# Patient Record
Sex: FEMALE | Race: WHITE | Employment: FULL TIME | URBAN - METROPOLITAN AREA
[De-identification: names, ages, dates, MRNs, and addresses within clinical notes are randomized per-mention and may not be internally consistent; named-entity substitution may affect disease eponyms.]

---

## 2018-01-08 ENCOUNTER — HOSPITAL ENCOUNTER (EMERGENCY)
Age: 38
Discharge: HOME | End: 2018-01-08
Payer: COMMERCIAL

## 2018-01-08 VITALS
HEART RATE: 94 BPM | OXYGEN SATURATION: 100 % | RESPIRATION RATE: 16 BRPM | BODY MASS INDEX: 32.2 KG/M2 | WEIGHT: 230 LBS | DIASTOLIC BLOOD PRESSURE: 79 MMHG | HEIGHT: 71 IN | SYSTOLIC BLOOD PRESSURE: 129 MMHG | TEMPERATURE: 98.6 F

## 2018-01-08 DIAGNOSIS — T14.8XXA BLISTER: ICD-10-CM

## 2018-01-08 DIAGNOSIS — T75.4XXA ELECTRICAL SHOCK OF HAND, INITIAL ENCOUNTER: Primary | ICD-10-CM

## 2018-01-08 LAB
EKG ATRIAL RATE: 86 BPM
EKG P AXIS: 41 DEGREES
EKG P-R INTERVAL: 160 MS
EKG Q-T INTERVAL: 380 MS
EKG QRS DURATION: 90 MS
EKG QTC CALCULATION (BAZETT): 454 MS
EKG R AXIS: -5 DEGREES
EKG T AXIS: 5 DEGREES
EKG VENTRICULAR RATE: 86 BPM

## 2018-01-08 PROCEDURE — 99284 EMERGENCY DEPT VISIT MOD MDM: CPT

## 2018-01-08 PROCEDURE — 93005 ELECTROCARDIOGRAM TRACING: CPT

## 2018-01-08 PROCEDURE — 93010 ELECTROCARDIOGRAM REPORT: CPT | Performed by: INTERNAL MEDICINE

## 2018-01-08 ASSESSMENT — ENCOUNTER SYMPTOMS
VOICE CHANGE: 0
RHINORRHEA: 0
TROUBLE SWALLOWING: 0
SINUS PRESSURE: 0
COUGH: 0
SORE THROAT: 0
STRIDOR: 0
FACIAL SWELLING: 0
SHORTNESS OF BREATH: 0
CHOKING: 0
SINUS PAIN: 0
CHEST TIGHTNESS: 0
WHEEZING: 0

## 2018-01-08 ASSESSMENT — PAIN DESCRIPTION - DESCRIPTORS: DESCRIPTORS: BURNING

## 2018-01-08 ASSESSMENT — PAIN DESCRIPTION - ONSET: ONSET: SUDDEN

## 2018-01-08 ASSESSMENT — PAIN DESCRIPTION - FREQUENCY: FREQUENCY: CONTINUOUS

## 2018-01-08 ASSESSMENT — PAIN DESCRIPTION - LOCATION: LOCATION: FINGER (COMMENT WHICH ONE)

## 2018-01-08 ASSESSMENT — PAIN DESCRIPTION - PROGRESSION: CLINICAL_PROGRESSION: NOT CHANGED

## 2018-01-08 ASSESSMENT — PAIN SCALES - GENERAL: PAINLEVEL_OUTOF10: 4

## 2018-01-08 ASSESSMENT — PAIN DESCRIPTION - ORIENTATION: ORIENTATION: LEFT;MID

## 2018-01-08 ASSESSMENT — PAIN DESCRIPTION - PAIN TYPE: TYPE: ACUTE PAIN

## 2018-01-08 NOTE — ED PROVIDER NOTES
[address] [facility] ED  eMERGENCY dEPARTMENT eNCOUnter      Pt Name: Ck Dc  MRN: [id number]  [place] 1980  Date of evaluation: 1/8/2018  Provider: Anne Sepulveda NP     CHIEF COMPLAINT       Chief Complaint   Patient presents with    Electric Shock     Pt got shocked on a copier at 0715 this morning. Has blister to left middle finger       HISTORY OF PRESENT ILLNESS   (Location/Symptom, Timing/Onset, Context/Setting, Quality, Duration, Modifying Factors, Severity) Note limiting factors. This is a 44year old female patient who presents to the ER after being shocked on her left middle finger by a copy machine. The patient states that she felt an electrical shock and was unable to move her finger from the button that shocked her. The patient denies any other injury, chest pain, shortness of breath. The patient did not pass out and denies any headache, lightheadedness, dizziness, blurred vision or double vision. Nursing Notes were reviewed. REVIEW OF SYSTEMS    (2+ for level 4; 10+ for level 5)     Review of Systems   Constitutional: Negative for activity change and fever. HENT: Negative for congestion, ear discharge, ear pain, facial swelling, postnasal drip, rhinorrhea, sinus pain, sinus pressure, sneezing, sore throat, trouble swallowing and voice change. Respiratory: Negative for cough, choking, chest tightness, shortness of breath, wheezing and stridor. Cardiovascular: Negative for chest pain. Neurological: Negative for dizziness, tremors, seizures, syncope, facial asymmetry, speech difficulty, weakness, light-headedness, numbness and headaches. Except as noted above the remainder of the review of systems was reviewed and negative.      PAST MEDICAL HISTORY     Past Medical History:   Diagnosis Date    Mitral valve prolapse     Ovarian cyst        SURGICAL HISTORY       Past Surgical History:   Procedure Laterality Date    HERNIA REPAIR  6/28/12 patient has full ROM of the finger and has minimal pain. Psychiatric: She has a normal mood and affect. DIAGNOSTIC RESULTS     EKG: All EKG's are interpreted by the Emergency Department Physician who either signs or Co-signs this chart in the absence of a cardiologist.      RADIOLOGY:   Non-plain film images such as CT, Ultrasound and MRI are read by the radiologist. Plain radiographic images are visualized and preliminarily interpreted by the emergency physician with the below findings:    Patient's EKG shows normal sinus rhythm with ventricular rate of 86. There is no st elevation or depression. The patient does not have a previous EKG for comparison. Interpretation per the Radiologist below (if available at the time of note entry):    No orders to display       LABS:  Labs Reviewed - No data to display    All other labs were within normal range or not returned as of this dictation. EMERGENCY DEPARTMENT COURSE and DIFFERENTIAL DIAGNOSIS/MDM:   Vitals:    Vitals:    01/08/18 0847   BP: 129/79   Pulse: 94   Resp: 16   Temp: 98.6 °F (37 °C)   TempSrc: Oral   SpO2: 100%   Weight: 230 lb (104.3 kg)   Height: 5' 11\" (1.803 m)       MDM    The patient presents to the ER with the above stated complaint. An ekg was obtained to rule out any arrhythmia, which is not present. The patient will be discharged home with instructions not to pop the blister if it becomes fluid contained to let it pop on its own and to watch for signs of infection. The patient was instructed to follow up with [facility] and to return to the ER for any new and concerning symptoms      CRITICAL CARE TIME     Total Critical Care time (not applicable if blank)      Total minutes, excluding separately reportable procedures. There was a high probability of clinically significant/life threatening deterioration in the patient's condition which required my urgent intervention.  This includes discussing the case with consultants, reviewing laboratory studies and images independently, arranging disposition, and speaking with patient/family    CONSULTS:  None    PROCEDURES:  Unless otherwise noted below, none     Procedures    FINAL IMPRESSION      1. Electrical shock of hand, initial encounter    2.  Blister          DISPOSITION/PLAN   DISPOSITION Decision To Discharge 01/08/2018 09:40:09 AM      PATIENT REFERRED TO:  Dejon Núñezmarcossharon Villalobos [phone]    In 1 day        DISCHARGE MEDICATIONS:  Discharge Medication List as of 1/8/2018  9:41 AM             (Please note that portions of this note were completed with a voice recognition program.  Efforts were made to edit the dictations but occasionally words and phrases are mis-transcribed.)    Jaylon Medina NP  (electronically signed)                 Jaylon Medina NP  01/08/18 8963

## 2018-03-14 ENCOUNTER — EMPLOYEE WELLNESS (OUTPATIENT)
Dept: OTHER | Age: 38
End: 2018-03-14

## 2018-03-14 LAB
CHOLESTEROL, TOTAL: 203 MG/DL (ref 0–199)
GLUCOSE BLD-MCNC: 80 MG/DL (ref 74–109)
HDLC SERPL-MCNC: 40 MG/DL (ref 40–59)
LDL CHOLESTEROL CALCULATED: 122 MG/DL (ref 0–129)
TRIGL SERPL-MCNC: 207 MG/DL (ref 0–200)

## 2018-04-02 VITALS — WEIGHT: 265 LBS | BODY MASS INDEX: 36.96 KG/M2

## 2020-01-15 ENCOUNTER — OFFICE VISIT (OUTPATIENT)
Dept: FAMILY MEDICINE CLINIC | Age: 40
End: 2020-01-15
Payer: COMMERCIAL

## 2020-01-15 VITALS
HEART RATE: 117 BPM | WEIGHT: 270 LBS | SYSTOLIC BLOOD PRESSURE: 124 MMHG | HEIGHT: 71 IN | RESPIRATION RATE: 15 BRPM | DIASTOLIC BLOOD PRESSURE: 82 MMHG | TEMPERATURE: 99 F | OXYGEN SATURATION: 98 % | BODY MASS INDEX: 37.8 KG/M2

## 2020-01-15 LAB
INFLUENZA A ANTIBODY: ABNORMAL
INFLUENZA B ANTIBODY: ABNORMAL

## 2020-01-15 PROCEDURE — 99213 OFFICE O/P EST LOW 20 MIN: CPT | Performed by: NURSE PRACTITIONER

## 2020-01-15 PROCEDURE — 87804 INFLUENZA ASSAY W/OPTIC: CPT | Performed by: NURSE PRACTITIONER

## 2020-01-15 RX ORDER — OSELTAMIVIR PHOSPHATE 75 MG/1
75 CAPSULE ORAL 2 TIMES DAILY
Qty: 10 CAPSULE | Refills: 0 | Status: SHIPPED | OUTPATIENT
Start: 2020-01-15 | End: 2020-01-20

## 2020-01-15 ASSESSMENT — PATIENT HEALTH QUESTIONNAIRE - PHQ9
SUM OF ALL RESPONSES TO PHQ QUESTIONS 1-9: 0
2. FEELING DOWN, DEPRESSED OR HOPELESS: 0
SUM OF ALL RESPONSES TO PHQ9 QUESTIONS 1 & 2: 0
SUM OF ALL RESPONSES TO PHQ QUESTIONS 1-9: 0
1. LITTLE INTEREST OR PLEASURE IN DOING THINGS: 0

## 2020-01-15 NOTE — PROGRESS NOTES
Subjective:      Patient ID: Aman Urias is a 35 y.o. female who presents today for:  Chief Complaint   Patient presents with    Fever     Patient present today with C/O body aches and cough and fever and headache and loss of appetiate. Patient states that this has been going on for two days and has tried OTC medication with no signs of relife.  Cough    Headache       Fever    This is a new problem. The current episode started in the past 7 days. The problem occurs daily. The problem has been unchanged. Associated symptoms include congestion, coughing, headaches, muscle aches and a sore throat. Pertinent negatives include no chest pain, ear pain, rash, vomiting or wheezing. She has tried nothing for the symptoms.        Past Medical History:   Diagnosis Date    Mitral valve prolapse     Ovarian cyst      Past Surgical History:   Procedure Laterality Date    HERNIA REPAIR  6/28/12    left femoral hernia     Social History     Socioeconomic History    Marital status:      Spouse name: Not on file    Number of children: Not on file    Years of education: Not on file    Highest education level: Not on file   Occupational History    Not on file   Social Needs    Financial resource strain: Not on file    Food insecurity:     Worry: Not on file     Inability: Not on file    Transportation needs:     Medical: Not on file     Non-medical: Not on file   Tobacco Use    Smoking status: Never Smoker    Smokeless tobacco: Never Used   Substance and Sexual Activity    Alcohol use: No    Drug use: No    Sexual activity: Yes     Partners: Male   Lifestyle    Physical activity:     Days per week: Not on file     Minutes per session: Not on file    Stress: Not on file   Relationships    Social connections:     Talks on phone: Not on file     Gets together: Not on file     Attends Yazidism service: Not on file     Active member of club or organization: Not on file     Attends meetings of clubs or organizations: Not on file     Relationship status: Not on file    Intimate partner violence:     Fear of current or ex partner: Not on file     Emotionally abused: Not on file     Physically abused: Not on file     Forced sexual activity: Not on file   Other Topics Concern    Not on file   Social History Narrative    Not on file     No family history on file. Allergies   Allergen Reactions    Augmentin [Amoxicillin-Pot Clavulanate] Rash     Skin peel    Omeprazole Other (See Comments)    Bactrim Rash    Ciprofloxacin Rash         Review of Systems   Constitutional: Positive for activity change, fatigue and fever. Negative for appetite change and chills. HENT: Positive for congestion, postnasal drip, rhinorrhea, sinus pressure and sore throat. Negative for ear discharge, ear pain, sinus pain and trouble swallowing. Eyes: Negative for pain and discharge. Respiratory: Positive for cough. Negative for shortness of breath and wheezing. Cardiovascular: Negative for chest pain and palpitations. Gastrointestinal: Negative for vomiting. Skin: Negative for color change and rash. Neurological: Positive for headaches. Hematological: Negative for adenopathy. Objective:   /82   Pulse 117   Temp 99 °F (37.2 °C) (Temporal)   Resp 15   Ht 5' 11\" (1.803 m)   Wt 270 lb (122.5 kg)   SpO2 98%   BMI 37.66 kg/m²     Physical Exam  Vitals signs reviewed. Constitutional:       General: She is not in acute distress. Appearance: She is ill-appearing. HENT:      Head: Normocephalic and atraumatic. Right Ear: A middle ear effusion is present. Left Ear: A middle ear effusion is present. Nose: Congestion and rhinorrhea present. Mouth/Throat:      Lips: Pink. Mouth: Mucous membranes are moist.      Pharynx: Posterior oropharyngeal erythema present. Eyes:      Conjunctiva/sclera: Conjunctivae normal.   Neck:      Musculoskeletal: Neck supple.    Cardiovascular:

## 2020-01-15 NOTE — PATIENT INSTRUCTIONS
Patient Education        Influenza (Flu): Care Instructions  Your Care Instructions    Influenza (flu) is an infection in the lungs and breathing passages. It is caused by the influenza virus. There are different strains, or types, of the flu virus from year to year. Unlike the common cold, the flu comes on suddenly and the symptoms, such as a cough, congestion, fever, chills, fatigue, aches, and pains, are more severe. These symptoms may last up to 10 days. Although the flu can make you feel very sick, it usually doesn't cause serious health problems. Home treatment is usually all you need for flu symptoms. But your doctor may prescribe antiviral medicine to prevent other health problems, such as pneumonia, from developing. Older people and those who have a long-term health condition, such as lung disease, are most at risk for having pneumonia or other health problems. Follow-up care is a key part of your treatment and safety. Be sure to make and go to all appointments, and call your doctor if you are having problems. It's also a good idea to know your test results and keep a list of the medicines you take. How can you care for yourself at home? · Get plenty of rest.  · Drink plenty of fluids, enough so that your urine is light yellow or clear like water. If you have kidney, heart, or liver disease and have to limit fluids, talk with your doctor before you increase the amount of fluids you drink. · Take an over-the-counter pain medicine if needed, such as acetaminophen (Tylenol), ibuprofen (Advil, Motrin), or naproxen (Aleve), to relieve fever, headache, and muscle aches. Read and follow all instructions on the label. No one younger than 20 should take aspirin. It has been linked to Reye syndrome, a serious illness. · Do not smoke. Smoking can make the flu worse. If you need help quitting, talk to your doctor about stop-smoking programs and medicines.  These can increase your chances of quitting for your doctor if:    · You begin to get better and then get worse.     · You are not getting better after 1 week. Where can you learn more? Go to [email or web address] and sign in to your [employer] account. Enter P362 in the [employer] box to learn more about \"Influenza (Flu): Care Instructions. \"     If you do not have an account, please click on the \"Sign Up Now\" link. Current as of: June 9, 2019  Content Version: 12.3  © 4119-1080 [employer]wise, Incorporated. Care instructions adapted under license by [facility] ([facility]). If you have questions about a medical condition or this instruction, always ask your healthcare professional. [address] any warranty or liability for your use of this information.

## 2020-01-16 ASSESSMENT — ENCOUNTER SYMPTOMS
TROUBLE SWALLOWING: 0
COLOR CHANGE: 0
COUGH: 1
SHORTNESS OF BREATH: 0
EYE DISCHARGE: 0
WHEEZING: 0
SINUS PRESSURE: 1
RHINORRHEA: 1
SINUS PAIN: 0
SORE THROAT: 1
VOMITING: 0
EYE PAIN: 0

## 2020-01-27 ENCOUNTER — HOSPITAL ENCOUNTER (OUTPATIENT)
Dept: WOMENS IMAGING | Age: 40
Discharge: HOME | End: 2020-01-29
Payer: COMMERCIAL

## 2020-01-27 ENCOUNTER — HOSPITAL ENCOUNTER (OUTPATIENT)
Dept: ULTRASOUND IMAGING | Age: 40
Discharge: HOME | End: 2020-01-29
Payer: COMMERCIAL

## 2020-01-27 PROCEDURE — 76642 ULTRASOUND BREAST LIMITED: CPT

## 2020-01-27 PROCEDURE — G0279 TOMOSYNTHESIS, MAMMO: HCPCS

## 2020-04-08 LAB
HBV SURFACE AB TITR SER: REACTIVE MIU/ML
HEPATITIS B SURFACE ANTIGEN INTERPRETATION: NORMAL
HEPATITIS C ANTIBODY INTERPRETATION: NORMAL

## 2020-04-09 LAB — HIV 1,2 COMBO ANTIGEN/ANTIBODY: NEGATIVE

## 2021-06-02 LAB — HIV AG/AB: NONREACTIVE

## 2021-10-01 ENCOUNTER — HOSPITAL ENCOUNTER (OUTPATIENT)
Dept: ULTRASOUND IMAGING | Age: 41
Discharge: HOME | End: 2021-10-03
Payer: COMMERCIAL

## 2021-10-01 ENCOUNTER — HOSPITAL ENCOUNTER (OUTPATIENT)
Dept: WOMENS IMAGING | Age: 41
Discharge: HOME | End: 2021-10-03
Payer: COMMERCIAL

## 2021-10-01 DIAGNOSIS — N63.0 LUMP OR MASS IN BREAST: ICD-10-CM

## 2021-10-01 DIAGNOSIS — E04.1 NONTOXIC UNINODULAR GOITER: ICD-10-CM

## 2021-10-01 DIAGNOSIS — Z12.31 ENCOUNTER FOR SCREENING MAMMOGRAM FOR BREAST CANCER: ICD-10-CM

## 2021-10-01 PROCEDURE — 76536 US EXAM OF HEAD AND NECK: CPT

## 2021-10-01 PROCEDURE — 77063 BREAST TOMOSYNTHESIS BI: CPT

## 2021-11-11 ENCOUNTER — INITIAL CONSULT (OUTPATIENT)
Dept: INTERVENTIONAL RADIOLOGY/VASCULAR | Age: 41
End: 2021-11-11
Payer: COMMERCIAL

## 2021-11-11 VITALS
HEART RATE: 87 BPM | HEIGHT: 71 IN | BODY MASS INDEX: 37.8 KG/M2 | SYSTOLIC BLOOD PRESSURE: 118 MMHG | WEIGHT: 270 LBS | DIASTOLIC BLOOD PRESSURE: 80 MMHG | RESPIRATION RATE: 12 BRPM

## 2021-11-11 DIAGNOSIS — R59.0 CERVICAL LYMPHADENOPATHY: Primary | ICD-10-CM

## 2021-11-11 DIAGNOSIS — E04.1 LEFT THYROID NODULE: ICD-10-CM

## 2021-11-11 DIAGNOSIS — R53.83 FATIGUE, UNSPECIFIED TYPE: ICD-10-CM

## 2021-11-11 DIAGNOSIS — R93.89 ABNORMAL THYROID ULTRASOUND: ICD-10-CM

## 2021-11-11 DIAGNOSIS — M25.50 GENERALIZED JOINT PAIN: ICD-10-CM

## 2021-11-11 DIAGNOSIS — M62.81 GENERALIZED MUSCLE WEAKNESS: ICD-10-CM

## 2021-11-11 DIAGNOSIS — E04.2 MULTINODULAR THYROID: ICD-10-CM

## 2021-11-11 DIAGNOSIS — R49.0 HOARSENESS OF VOICE: ICD-10-CM

## 2021-11-11 DIAGNOSIS — R50.9 LOW GRADE FEVER: ICD-10-CM

## 2021-11-11 PROCEDURE — 99244 OFF/OP CNSLTJ NEW/EST MOD 40: CPT | Performed by: NURSE PRACTITIONER

## 2021-11-11 ASSESSMENT — ENCOUNTER SYMPTOMS
ABDOMINAL PAIN: 0
ABDOMINAL DISTENTION: 0
TROUBLE SWALLOWING: 0
BACK PAIN: 0
SHORTNESS OF BREATH: 0
NAUSEA: 0
RESPIRATORY NEGATIVE: 1
VOMITING: 0
CONSTIPATION: 0
DIARRHEA: 0
EYES NEGATIVE: 1
VOICE CHANGE: 1
COUGH: 0
EYE DISCHARGE: 0
WHEEZING: 0
SORE THROAT: 0
EYE ITCHING: 0
EYE REDNESS: 0
GASTROINTESTINAL NEGATIVE: 1
EYE PAIN: 0

## 2021-11-11 NOTE — PROGRESS NOTES
chronic muscle and joint weakness. Skin: Negative. Neurological: Negative. Negative for dizziness, light-headedness and headaches. Hematological: Negative. Psychiatric/Behavioral: Negative. OBJECTIVE:  /80   Pulse 87   Resp 12   Ht 5' 11\" (1.803 m)   Wt 270 lb (122.5 kg)   BMI 37.66 kg/m²     Physical Exam  Constitutional:       General: She is not in acute distress. Appearance: Normal appearance. She is not ill-appearing. HENT:      Head: Normocephalic and atraumatic. Nose: No congestion. Cardiovascular:      Rate and Rhythm: Normal rate. Heart sounds: Normal heart sounds. Pulmonary:      Effort: Pulmonary effort is normal.   Abdominal:      Palpations: Abdomen is soft. Musculoskeletal:         General: Normal range of motion. Cervical back: Normal range of motion. Right lower leg: No edema. Left lower leg: No edema. Skin:     General: Skin is warm and dry. Capillary Refill: Capillary refill takes 2 to 3 seconds. Neurological:      Mental Status: She is alert and oriented to person, place, and time. Psychiatric:         Mood and Affect: Mood normal.         Behavior: Behavior normal.         Narrative   EXAMINATION:  ULTRASOUND THYROID       CLINICAL HISTORY: E04.1 Nontoxic uninodular goiter ICD10       COMPARISONS:  February 11, 2013       TECHNIQUE:  Grayscale and duplex color ultrasound       FINDINGS:    The right lobe of thyroid measures 5.3 x 1.9 x 1.6 in the long, AP and transverse dimension. There is a normal homogeneous uniform echogenicity. There are 2 nodules seen within the right lobe. The nodule in the midportion measures 0.2 x 0.2 x 0.2 cm   . Composition is cystic 0 points   Echogenicity hypoechoic 2 points   Shape wider than tall 0 points   Margin smooth 0 points   Echogenic foci none 0 points   TR2: Not suspicious; no FNA required       Nodule at the inferior aspect measures 0.3 x 0.2 x 0 point centimeters. Composition mixed solid and cystic 1 points   Echogenicity hypoechoic 2 points   Shape wider than tall 0 points   Margin smooth 0 points   Echogenic foci none 0 points   TR3: Mildly suspicious;  1.5 cm follow up,  2.5 cm FNA             follow up: 1, 3 and 5 years       The isthmus measures 0.3 cm.       The left lobe of thyroid measures 5.2 x 1.9 x 1.5 cm in the long, AP and transverse dimension. It is of normal homogeneous uniform echogenicity.       There is a nodule superiorly measuring 0.2 x 0.2 x 0.2 cm. Composition is cystic 0 points   Echogenicity hypoechoic 2 points   Shape wider than tall 0 points   Margin smooth 0 points   Echogenic foci none 0 points   TR2: Not suspicious; no FNA required       There is a nodule at the posterior middle third measuring 1.2 x 0.8 x 0.8 cm. Composition is solid 2 points   Echogenicity hypoechoic 2 points   Shape wider than tall 0 points   Margin smooth 0 points   Echogenic punctate echogenic foci 3. TR5: Highly suspicious;  0.5 cm follow up,  1.0 cm FNA             annual follow up for up to 5 years       Right-sided  lymph node measuring 1.7 x 0.4 cm.           Impression   THERE IS A NODULE IN THE POSTERIOR ASPECT OF THE MIDDLE THIRD OF THE LEFT THYROID AS DESCRIBED ABOVE. FURTHER EVALUATION AS PER CRITERIA ABOVE WILL BE NEEDED                   Medications and lab work reviewed. Chart reviewed of pertinent information. US report reviewed personally by myself and discussed with patient. Assessment:      Diagnosis Orders   1. Cervical lymphadenopathy  US BIOPSY LYMPH NODE    Surgical Pathology    Leukemia / Lymphoma Phenotype   2. Abnormal thyroid ultrasound  Surgical Pathology    US BIOPSY THYROID    US BIOPSY LYMPH NODE    Surgical Pathology    Leukemia / Lymphoma Phenotype   3. Left thyroid nodule  Surgical Pathology    US BIOPSY THYROID   4. Multinodular thyroid  Surgical Pathology    US BIOPSY THYROID   5.  Fatigue, unspecified type  Surgical Pathology Leukemia / Lymphoma Phenotype   6. Generalized muscle weakness     7. Generalized joint pain     8. Hoarseness of voice     9. Low grade fever          Orders Placed This Encounter   Procedures    US BIOPSY THYROID     Standing Status:   Future     Standing Expiration Date:   11/11/2022    US BIOPSY LYMPH NODE     Standing Status:   Future     Standing Expiration Date:   11/11/2022     Order Specific Question:   Reason for exam:     Answer:   right cervical     Order Specific Question:   Laterality     Answer:   Right    Surgical Pathology     Standing Status:   Future     Standing Expiration Date:   11/11/2022     Order Specific Question:   PREVIOUS BIOPSY     Answer:   No     Order Specific Question:   PREOP DIAGNOSIS     Answer:   left thyroid nodule Bx     Order Specific Question:   FROZEN SECTION - NO OR YES/SPECIMEN     Answer:   No    Surgical Pathology     Standing Status:   Future     Standing Expiration Date:   11/11/2022     Order Specific Question:   PREVIOUS BIOPSY     Answer:   No     Order Specific Question:   PREOP DIAGNOSIS     Answer:   right neck lymph node     Order Specific Question:   FROZEN SECTION - NO OR YES/SPECIMEN     Answer:   No    Leukemia / Lymphoma Phenotype     Right neck     Standing Status:   Future     Standing Expiration Date:   11/11/2022      No orders of the defined types were placed in this encounter. 1. Left thyroid nodule TR 5, 1.2 cm. of unknown etiology. 2. Multinodular thyroid of unknown etiology. Multiple subcentimeter nodules on both left and right lobes. 3. Right neck lymphadenopathy      Plan:   1. In clinic percutaneous US needle biopsy of left thyroid nodule right neck enlarged lymph node.  Procedures with risks including infection, bleeding, pain at site, possible damage/puncture of major surrounding vessels, and possibility of inconclusive results, and with any procedure there is a risk of unforseen complications and/or reactions that could potentially lead to death discussed with patient. Patient wishes to proceed. 2. Follow up with referring Dr. Elia Desai post biopsy or as scheduled for pathology results and further management of remaining nodules. Thank You Dr. Elia Desai for referral of your patient to our practice for care.      ALBARO Ramos - CNP

## 2021-12-07 ENCOUNTER — PROCEDURE VISIT (OUTPATIENT)
Dept: INTERVENTIONAL RADIOLOGY/VASCULAR | Age: 41
End: 2021-12-07

## 2021-12-07 VITALS
WEIGHT: 270 LBS | HEART RATE: 87 BPM | SYSTOLIC BLOOD PRESSURE: 118 MMHG | DIASTOLIC BLOOD PRESSURE: 80 MMHG | BODY MASS INDEX: 37.66 KG/M2

## 2021-12-07 DIAGNOSIS — R53.83 FATIGUE, UNSPECIFIED TYPE: ICD-10-CM

## 2021-12-07 DIAGNOSIS — E04.1 LEFT THYROID NODULE: ICD-10-CM

## 2021-12-07 DIAGNOSIS — R59.0 CERVICAL LYMPHADENOPATHY: ICD-10-CM

## 2021-12-07 DIAGNOSIS — R93.89 ABNORMAL THYROID ULTRASOUND: ICD-10-CM

## 2021-12-07 DIAGNOSIS — R59.0: ICD-10-CM

## 2021-12-07 DIAGNOSIS — E04.2 MULTINODULAR THYROID: ICD-10-CM

## 2021-12-07 DIAGNOSIS — E04.1 THYROID NODULE: Primary | ICD-10-CM

## 2021-12-08 NOTE — PROGRESS NOTES
Examination chaperoned by Regino Esparza MA.     Lidocaine  LOt # -DK exp 02/01/22
signs,    assisting throughout the procedure.

## 2021-12-09 LAB
INTERPRETATION: NORMAL
NUMBER OF MARKERS: 22 MARKERS
PROF LEUK/LYMPH PHENO 16 OR MORE MARKERS: NORMAL
SOURCE: NORMAL
TECHNICAL LEUK/LYMPH PHENO, 22 MARKERS: NORMAL

## 2022-01-20 ENCOUNTER — OFFICE VISIT (OUTPATIENT)
Dept: FAMILY MEDICINE CLINIC | Age: 42
End: 2022-01-20
Payer: COMMERCIAL

## 2022-01-20 VITALS
BODY MASS INDEX: 32.76 KG/M2 | TEMPERATURE: 97.1 F | HEART RATE: 101 BPM | DIASTOLIC BLOOD PRESSURE: 78 MMHG | OXYGEN SATURATION: 99 % | WEIGHT: 234 LBS | RESPIRATION RATE: 16 BRPM | SYSTOLIC BLOOD PRESSURE: 118 MMHG | HEIGHT: 71 IN

## 2022-01-20 DIAGNOSIS — Z13.220 SCREENING CHOLESTEROL LEVEL: ICD-10-CM

## 2022-01-20 DIAGNOSIS — R00.0 TACHYCARDIA: ICD-10-CM

## 2022-01-20 DIAGNOSIS — M25.50 ARTHRALGIA, UNSPECIFIED JOINT: ICD-10-CM

## 2022-01-20 DIAGNOSIS — E04.1 THYROID NODULE: ICD-10-CM

## 2022-01-20 DIAGNOSIS — M25.50 ARTHRALGIA, UNSPECIFIED JOINT: Primary | ICD-10-CM

## 2022-01-20 DIAGNOSIS — I34.1 MITRAL VALVE PROLAPSE: ICD-10-CM

## 2022-01-20 LAB
C-REACTIVE PROTEIN: <3 MG/L (ref 0–5)
CHOLESTEROL, TOTAL: 223 MG/DL (ref 0–199)
HDLC SERPL-MCNC: 48 MG/DL (ref 40–59)
LDL CHOLESTEROL CALCULATED: 154 MG/DL (ref 0–129)
SEDIMENTATION RATE, ERYTHROCYTE: 3 MM (ref 0–20)
T3 TOTAL: 104 NG/DL (ref 60–181)
T4 FREE: 1.12 NG/DL (ref 0.84–1.68)
TRIGL SERPL-MCNC: 104 MG/DL (ref 0–150)

## 2022-01-20 PROCEDURE — 99214 OFFICE O/P EST MOD 30 MIN: CPT | Performed by: PHYSICIAN ASSISTANT

## 2022-01-20 SDOH — ECONOMIC STABILITY: FOOD INSECURITY: WITHIN THE PAST 12 MONTHS, YOU WORRIED THAT YOUR FOOD WOULD RUN OUT BEFORE YOU GOT MONEY TO BUY MORE.: NEVER TRUE

## 2022-01-20 SDOH — ECONOMIC STABILITY: FOOD INSECURITY: WITHIN THE PAST 12 MONTHS, THE FOOD YOU BOUGHT JUST DIDN'T LAST AND YOU DIDN'T HAVE MONEY TO GET MORE.: NEVER TRUE

## 2022-01-20 ASSESSMENT — PATIENT HEALTH QUESTIONNAIRE - PHQ9
SUM OF ALL RESPONSES TO PHQ QUESTIONS 1-9: 0
1. LITTLE INTEREST OR PLEASURE IN DOING THINGS: 0
2. FEELING DOWN, DEPRESSED OR HOPELESS: 0
SUM OF ALL RESPONSES TO PHQ9 QUESTIONS 1 & 2: 0
SUM OF ALL RESPONSES TO PHQ QUESTIONS 1-9: 0

## 2022-01-20 ASSESSMENT — ENCOUNTER SYMPTOMS
WHEEZING: 0
ABDOMINAL DISTENTION: 0
COLOR CHANGE: 0
ABDOMINAL PAIN: 0
TROUBLE SWALLOWING: 0
COUGH: 0
SHORTNESS OF BREATH: 0
CHEST TIGHTNESS: 0

## 2022-01-20 ASSESSMENT — SOCIAL DETERMINANTS OF HEALTH (SDOH): HOW HARD IS IT FOR YOU TO PAY FOR THE VERY BASICS LIKE FOOD, HOUSING, MEDICAL CARE, AND HEATING?: NOT HARD AT ALL

## 2022-09-20 ENCOUNTER — OFFICE VISIT (OUTPATIENT)
Dept: FAMILY MEDICINE CLINIC | Age: 42
End: 2022-09-20
Payer: COMMERCIAL

## 2022-09-20 VITALS
OXYGEN SATURATION: 98 % | HEIGHT: 71 IN | DIASTOLIC BLOOD PRESSURE: 82 MMHG | BODY MASS INDEX: 32.76 KG/M2 | WEIGHT: 234 LBS | HEART RATE: 73 BPM | RESPIRATION RATE: 16 BRPM | SYSTOLIC BLOOD PRESSURE: 110 MMHG

## 2022-09-20 DIAGNOSIS — Z12.31 ENCOUNTER FOR SCREENING MAMMOGRAM FOR MALIGNANT NEOPLASM OF BREAST: ICD-10-CM

## 2022-09-20 DIAGNOSIS — Z13.220 SCREENING CHOLESTEROL LEVEL: ICD-10-CM

## 2022-09-20 DIAGNOSIS — E04.2 MULTIPLE THYROID NODULES: Primary | ICD-10-CM

## 2022-09-20 PROBLEM — I10 HYPERTENSION: Status: ACTIVE | Noted: 2021-11-11

## 2022-09-20 PROBLEM — K41.90 FEMORAL HERNIA: Status: ACTIVE | Noted: 2021-11-11

## 2022-09-20 PROBLEM — N83.202 BILATERAL OVARIAN CYSTS: Status: ACTIVE | Noted: 2021-11-11

## 2022-09-20 PROBLEM — N83.201 BILATERAL OVARIAN CYSTS: Status: ACTIVE | Noted: 2021-11-11

## 2022-09-20 PROBLEM — I49.9 ARRHYTHMIA, VENTRICULAR: Status: ACTIVE | Noted: 2021-11-11

## 2022-09-20 PROBLEM — I34.1 MITRAL VALVE PROLAPSE: Status: ACTIVE | Noted: 2021-11-11

## 2022-09-20 PROBLEM — R76.8 POSITIVE ANA (ANTINUCLEAR ANTIBODY): Status: ACTIVE | Noted: 2021-10-01

## 2022-09-20 PROCEDURE — 99213 OFFICE O/P EST LOW 20 MIN: CPT | Performed by: PHYSICIAN ASSISTANT

## 2022-09-20 NOTE — PROGRESS NOTES
Subjective  Vergia Mickie, 36 y.o. female presents today with:  Chief Complaint   Patient presents with    Follow-up     Pt would like to discuss getting ultrasound of neck for her thyroid        HPI  Routine follow up today. Last OV with me: 1/20/22 as new patient. Due for mammogram.   Due for fasting lipid, repeat. Thyroid nodule--had u/s and biopsy with dr. Donna Dougherty. Was told to monitor with u/s every 6 months. Biopsies were benign. Due for repeat u/s. HTN/tachycardia/MVP. Stable on lopressor 50 mg XR. Denies side effects. No chest pain, dizziness, light-headedness. Results for orders placed or performed in visit on 01/20/22   Sedimentation Rate   Result Value Ref Range    Sed Rate 3 0 - 20 mm   C-Reactive Protein   Result Value Ref Range    CRP <3.0 0.0 - 5.0 mg/L   Lipid Panel   Result Value Ref Range    Cholesterol, Total 223 (H) 0 - 199 mg/dL    Triglycerides 104 0 - 150 mg/dL    HDL 48 40 - 59 mg/dL    LDL Calculated 154 (H) 0 - 129 mg/dL   T3   Result Value Ref Range    T3, Total 104 60 - 181 ng/dL   T4, Free   Result Value Ref Range    T4 Free 1.12 0.84 - 1.68 ng/dL     Review of Systems   Constitutional:  Negative for activity change, appetite change, chills, diaphoresis, fatigue, fever and unexpected weight change. HENT:  Negative for congestion and trouble swallowing. Respiratory:  Negative for cough, chest tightness, shortness of breath and wheezing. Cardiovascular:  Negative for chest pain, palpitations and leg swelling. Gastrointestinal:  Negative for abdominal distention and abdominal pain. Musculoskeletal:  Positive for arthralgias (intermittent). Negative for gait problem, joint swelling and myalgias. Skin:  Negative for color change and rash. Neurological:  Negative for dizziness, weakness, light-headedness and headaches.      Past Medical History:   Diagnosis Date    Mitral valve prolapse     Ovarian cyst     Thyroid nodule      Past Surgical History: Height: 5' 11\" (1.803 m)     BP Readings from Last 3 Encounters:   09/20/22 110/82   01/20/22 118/78   12/07/21 118/80     Wt Readings from Last 3 Encounters:   09/20/22 234 lb (106.1 kg)   01/20/22 234 lb (106.1 kg)   12/07/21 270 lb (122.5 kg)     Physical Exam  Vitals reviewed. Constitutional:       Appearance: She is obese. HENT:      Head: Normocephalic and atraumatic. Right Ear: Tympanic membrane, ear canal and external ear normal.      Left Ear: Tympanic membrane, ear canal and external ear normal.      Nose: Nose normal.   Eyes:      Conjunctiva/sclera: Conjunctivae normal.   Cardiovascular:      Rate and Rhythm: Normal rate and regular rhythm. Pulmonary:      Effort: Pulmonary effort is normal.      Breath sounds: Normal breath sounds. Musculoskeletal:         General: Normal range of motion. Skin:     General: Skin is warm and dry. Neurological:      General: No focal deficit present. Mental Status: She is alert and oriented to person, place, and time. Assessment & Plan   Kiki Deutsch was seen today for follow-up. Diagnoses and all orders for this visit:    Multiple thyroid nodules  -     US HEAD NECK SOFT TISSUE THYROID; Future    Screening cholesterol level  -     Lipid, Fasting; Future    Encounter for screening mammogram for malignant neoplasm of breast  -     [facility]ON DIGITAL SCREEN W OR WO CAD BILATERAL; Future  Repeat labs. Overall, doing well. Mammogram.   6 month follow up with me. Orders Placed This Encounter   Procedures    US HEAD NECK SOFT TISSUE THYROID     This procedure can be scheduled via [employer]. Access your [employer] account by visiting [email or web address]. Standing Status:   Future     Standing Expiration Date:   9/20/2023     Order Specific Question:   Reason for exam:     Answer:   routine monitoring.     ALLYSON DIGITAL SCREEN W OR WO CAD BILATERAL     Standing Status:   Future     Standing Expiration Date:   9/20/2023     Order Specific Question:   Reason for exam:     Answer:   screening    Lipid, Fasting     Standing Status:   Future     Standing Expiration Date:   9/20/2023       No orders of the defined types were placed in this encounter. There are no discontinued medications. No follow-ups on file. Reviewed with the patient: current clinical status, medications, activities and diet. Side effects, adverse effects of the medication prescribed today, as well as treatment plan/ rationale and result expectations have been discussed with the patient who expresses understanding and desires to proceed. Close follow up to evaluate treatment results and for coordination of care. I have reviewed the patient's medical history in detail and updated the computerized patient record.     Yarelis Hudson PA-C

## 2022-09-27 ASSESSMENT — ENCOUNTER SYMPTOMS
ABDOMINAL DISTENTION: 0
TROUBLE SWALLOWING: 0
ABDOMINAL PAIN: 0
COUGH: 0
SHORTNESS OF BREATH: 0
CHEST TIGHTNESS: 0
COLOR CHANGE: 0
WHEEZING: 0

## 2023-05-09 ENCOUNTER — TELEPHONE (OUTPATIENT)
Dept: FAMILY MEDICINE CLINIC | Age: 43
End: 2023-05-09

## 2023-05-24 ENCOUNTER — OFFICE VISIT (OUTPATIENT)
Dept: FAMILY MEDICINE CLINIC | Age: 43
End: 2023-05-24

## 2023-05-24 VITALS
HEIGHT: 71 IN | WEIGHT: 247 LBS | SYSTOLIC BLOOD PRESSURE: 120 MMHG | BODY MASS INDEX: 34.58 KG/M2 | HEART RATE: 83 BPM | RESPIRATION RATE: 16 BRPM | OXYGEN SATURATION: 98 % | DIASTOLIC BLOOD PRESSURE: 88 MMHG

## 2023-05-24 DIAGNOSIS — R63.5 WEIGHT GAIN: ICD-10-CM

## 2023-05-24 DIAGNOSIS — R11.14 BILIOUS VOMITING WITH NAUSEA: ICD-10-CM

## 2023-05-24 DIAGNOSIS — Z00.00 ANNUAL PHYSICAL EXAM: ICD-10-CM

## 2023-05-24 DIAGNOSIS — Z12.31 ENCOUNTER FOR SCREENING MAMMOGRAM FOR MALIGNANT NEOPLASM OF BREAST: ICD-10-CM

## 2023-05-24 DIAGNOSIS — R10.11 RUQ PAIN: Primary | ICD-10-CM

## 2023-05-24 DIAGNOSIS — E04.2 MULTIPLE THYROID NODULES: ICD-10-CM

## 2023-05-24 RX ORDER — ONDANSETRON 4 MG/1
4 TABLET, ORALLY DISINTEGRATING ORAL 3 TIMES DAILY PRN
Qty: 21 TABLET | Refills: 2 | Status: SHIPPED | OUTPATIENT
Start: 2023-05-24

## 2023-05-24 RX ORDER — FAMOTIDINE 40 MG/1
40 TABLET, FILM COATED ORAL EVERY EVENING
Qty: 30 TABLET | Refills: 3 | Status: SHIPPED | OUTPATIENT
Start: 2023-05-24

## 2023-05-24 SDOH — ECONOMIC STABILITY: HOUSING INSECURITY
IN THE LAST 12 MONTHS, WAS THERE A TIME WHEN YOU DID NOT HAVE A STEADY PLACE TO SLEEP OR SLEPT IN A SHELTER (INCLUDING NOW)?: NO

## 2023-05-24 SDOH — ECONOMIC STABILITY: FOOD INSECURITY: WITHIN THE PAST 12 MONTHS, YOU WORRIED THAT YOUR FOOD WOULD RUN OUT BEFORE YOU GOT MONEY TO BUY MORE.: NEVER TRUE

## 2023-05-24 SDOH — ECONOMIC STABILITY: FOOD INSECURITY: WITHIN THE PAST 12 MONTHS, THE FOOD YOU BOUGHT JUST DIDN'T LAST AND YOU DIDN'T HAVE MONEY TO GET MORE.: NEVER TRUE

## 2023-05-24 SDOH — ECONOMIC STABILITY: INCOME INSECURITY: HOW HARD IS IT FOR YOU TO PAY FOR THE VERY BASICS LIKE FOOD, HOUSING, MEDICAL CARE, AND HEATING?: NOT HARD AT ALL

## 2023-05-24 SDOH — ECONOMIC STABILITY: TRANSPORTATION INSECURITY
IN THE PAST 12 MONTHS, HAS LACK OF TRANSPORTATION KEPT YOU FROM MEETINGS, WORK, OR FROM GETTING THINGS NEEDED FOR DAILY LIVING?: NO

## 2023-05-24 ASSESSMENT — PATIENT HEALTH QUESTIONNAIRE - PHQ9
SUM OF ALL RESPONSES TO PHQ9 QUESTIONS 1 & 2: 0
SUM OF ALL RESPONSES TO PHQ QUESTIONS 1-9: 0
SUM OF ALL RESPONSES TO PHQ QUESTIONS 1-9: 0
1. LITTLE INTEREST OR PLEASURE IN DOING THINGS: 0
SUM OF ALL RESPONSES TO PHQ QUESTIONS 1-9: 0
2. FEELING DOWN, DEPRESSED OR HOPELESS: 0
SUM OF ALL RESPONSES TO PHQ QUESTIONS 1-9: 0

## 2023-05-24 NOTE — PROGRESS NOTES
Subjective  Keegan Elliott, 36 y.o. female presents today with:  Chief Complaint   Patient presents with    Abdominal Pain     Pain in abdomen x3 week s with nausea      HPI  In the office today for follow up and acute concern. Last OV with me: 9/20/22. HM items discussed. Has been having worsening RUQ and epigastric pain over the past several weeks. Did have an episode of N/V at home, almost presented to ED if symptoms would not improve. Concern for GBD. Noted that symptoms worsen after eating higher fatty foods and meat. Since modifying diet, has had improvement in her symptoms. Some reflux. There is radiation to R shoulder blade. No known previous GBD history. +family history. Thyroid nodule--had u/s and biopsy with dr. Iris Reyez. Was told to monitor with u/s every 6 months. Biopsies were benign. Due for repeat u/s. HTN/tachycardia/MVP. Stable on lopressor 50 mg XR. Denies side effects. No chest pain, dizziness, light-headedness. +weight gain. Has been very busy with work, unable to be as active as she was 6 months ago. Needs to get back in to a routine, working 10+ hours/day. +stress. Review of Systems   Constitutional:  Positive for unexpected weight change. Negative for activity change, appetite change, chills, diaphoresis, fatigue and fever. HENT:  Negative for congestion and trouble swallowing. Respiratory:  Negative for cough, chest tightness, shortness of breath and wheezing. Cardiovascular:  Negative for chest pain, palpitations and leg swelling. Gastrointestinal:  Positive for abdominal pain, diarrhea, nausea and vomiting. Negative for abdominal distention, anal bleeding, blood in stool, constipation and rectal pain. Musculoskeletal:  Positive for arthralgias (intermittent). Negative for gait problem, joint swelling and myalgias. Skin:  Negative for color change and rash.    Neurological:  Negative for dizziness, weakness, light-headedness and

## 2023-05-27 DIAGNOSIS — R63.5 WEIGHT GAIN: ICD-10-CM

## 2023-05-27 DIAGNOSIS — E04.2 MULTIPLE THYROID NODULES: ICD-10-CM

## 2023-05-27 DIAGNOSIS — Z00.00 ANNUAL PHYSICAL EXAM: ICD-10-CM

## 2023-05-27 LAB
ALBUMIN SERPL-MCNC: 4.1 G/DL (ref 3.5–4.6)
ALP SERPL-CCNC: 70 U/L (ref 40–130)
ALT SERPL-CCNC: 6 U/L (ref 0–33)
ANION GAP SERPL CALCULATED.3IONS-SCNC: 8 MEQ/L (ref 9–15)
AST SERPL-CCNC: 6 U/L (ref 0–35)
BILIRUB SERPL-MCNC: 0.9 MG/DL (ref 0.2–0.7)
BUN SERPL-MCNC: 10 MG/DL (ref 6–20)
CALCIUM SERPL-MCNC: 10.5 MG/DL (ref 8.5–9.9)
CHLORIDE SERPL-SCNC: 110 MEQ/L (ref 95–107)
CHOLEST SERPL-MCNC: 194 MG/DL (ref 0–199)
CO2 SERPL-SCNC: 19 MEQ/L (ref 20–31)
CREAT SERPL-MCNC: 0.6 MG/DL (ref 0.5–0.9)
ERYTHROCYTE [DISTWIDTH] IN BLOOD BY AUTOMATED COUNT: 14 % (ref 11.5–14.5)
GLOBULIN SER CALC-MCNC: 2.6 G/DL (ref 2.3–3.5)
GLUCOSE SERPL-MCNC: 96 MG/DL (ref 70–99)
HBA1C MFR BLD: 4.6 % (ref 4.8–5.9)
HCT VFR BLD AUTO: 39.6 % (ref 37–47)
HDLC SERPL-MCNC: 42 MG/DL (ref 40–59)
HGB BLD-MCNC: 13.4 G/DL (ref 12–16)
LDL CHOLESTEROL CALCULATED: 131 MG/DL (ref 0–129)
MCH RBC QN AUTO: 28.1 PG (ref 27–31.3)
MCHC RBC AUTO-ENTMCNC: 34 % (ref 33–37)
MCV RBC AUTO: 82.7 FL (ref 79.4–94.8)
PLATELET # BLD AUTO: 275 K/UL (ref 130–400)
POTASSIUM SERPL-SCNC: 4 MEQ/L (ref 3.4–4.9)
PROT SERPL-MCNC: 6.7 G/DL (ref 6.3–8)
RBC # BLD AUTO: 4.79 M/UL (ref 4.2–5.4)
SODIUM SERPL-SCNC: 137 MEQ/L (ref 135–144)
TRIGLYCERIDE, FASTING: 105 MG/DL (ref 0–150)
TSH SERPL-MCNC: 2.87 UIU/ML (ref 0.44–3.86)
WBC # BLD AUTO: 7.2 K/UL (ref 4.8–10.8)

## 2023-05-27 ASSESSMENT — ENCOUNTER SYMPTOMS
ABDOMINAL DISTENTION: 0
COLOR CHANGE: 0
COUGH: 0
TROUBLE SWALLOWING: 0
ABDOMINAL PAIN: 1
BLOOD IN STOOL: 0
CHEST TIGHTNESS: 0
WHEEZING: 0
ANAL BLEEDING: 0
SHORTNESS OF BREATH: 0
NAUSEA: 1
RECTAL PAIN: 0
CONSTIPATION: 0
DIARRHEA: 1
VOMITING: 1

## 2023-05-30 LAB
INSULIN FREE SERPL-ACNC: 7 UIU/ML (ref 3–25)
INSULIN SERPL-ACNC: 10 UIU/ML (ref 3–25)

## 2023-06-03 ENCOUNTER — HOSPITAL ENCOUNTER (OUTPATIENT)
Dept: ULTRASOUND IMAGING | Age: 43
End: 2023-06-03
Payer: COMMERCIAL

## 2023-06-03 DIAGNOSIS — E04.2 MULTIPLE THYROID NODULES: ICD-10-CM

## 2023-06-03 DIAGNOSIS — R11.14 BILIOUS VOMITING WITH NAUSEA: ICD-10-CM

## 2023-06-03 DIAGNOSIS — R10.11 RUQ PAIN: ICD-10-CM

## 2023-06-03 PROCEDURE — 76536 US EXAM OF HEAD AND NECK: CPT

## 2023-06-03 PROCEDURE — 76705 ECHO EXAM OF ABDOMEN: CPT
